# Patient Record
Sex: FEMALE | Race: WHITE
[De-identification: names, ages, dates, MRNs, and addresses within clinical notes are randomized per-mention and may not be internally consistent; named-entity substitution may affect disease eponyms.]

---

## 2017-12-01 ENCOUNTER — HOSPITAL ENCOUNTER (OUTPATIENT)
Dept: HOSPITAL 59 - HOP | Age: 67
Discharge: HOME | End: 2017-12-01
Attending: INTERNAL MEDICINE
Payer: MEDICARE

## 2017-12-01 DIAGNOSIS — Z79.890: ICD-10-CM

## 2017-12-01 DIAGNOSIS — K52.9: Primary | ICD-10-CM

## 2017-12-01 DIAGNOSIS — F32.9: ICD-10-CM

## 2017-12-01 DIAGNOSIS — E78.00: ICD-10-CM

## 2017-12-01 DIAGNOSIS — I48.91: ICD-10-CM

## 2017-12-01 DIAGNOSIS — K57.30: ICD-10-CM

## 2017-12-01 DIAGNOSIS — I10: ICD-10-CM

## 2017-12-01 PROCEDURE — 00810: CPT

## 2017-12-01 PROCEDURE — 88305 TISSUE EXAM BY PATHOLOGIST: CPT

## 2017-12-04 NOTE — OPERATIVE NOTE
DATE OF SURGERY:  12/01/2017



SURGEON: Carla Palmer MD  



OPERATION: COLONOSCOPY. 



INDICATIONS: This is a 67-year-old female with history of chronic diarrhea who presented for 
colonoscopy. 



POSTOPERATIVE DIAGNOSES:

1. Left-sided colonic diverticulosis. 

2. Otherwise normal colon and terminal ileal mucosa. 



ANESTHESIA: Sedation is per Anesthesia. Pulse oximetry was monitored throughout the procedure to 
maintain O2 saturation of 90% or greater. Supplemental oxygen was administered via nasal cannula. 
Cardiac and vital signs were monitored throughout the duration of the procedure, and they were 
stable. 



The procedure of colonoscopy and risks and alternatives of the procedure, including the risk of 
bleeding and perforation, among others, were explained to the patient who voiced understanding and 
agreed to have the procedure done. Physical examination was performed, and the patient was found 
stable for sedation. 



PROCEDURE: The patient was placed in the left lateral position. Sedation was initiated. A digital 
rectal exam was performed and showed some mild external hemorrhoids with no palpable rectal masses. 
An Olympus PCF-180AL colonoscope was then inserted into the rectum under direct visualization. It 
was advanced to the cecum without difficulty. The ileocecal valve and appendiceal orifice were 
identified and photographed. The colonic mucosa was carefully examined upon introduction of the 
colonoscope. There were scattered diverticula noted in the sigmoid and descending colon. There were 
no other lesions noted. The ileocecal valve was intubated and terminal ileal mucosa was inspected 
for about 10 cm and it appeared normal. The colonoscope was then withdrawn while carefully examining 
the colonic mucosal surfaces. No other lesions were noted. In the rectum, retroflexion was performed 
and grade 1 internal hemorrhoids were noted. Random colon biopsies were obtained to rule out 
microscopic colitis. 



RECOMMENDATIONS: 

1. The patient should be on a high-fiber diet. 

2. The patient is to have a repeat colonoscopy for screening in about 10 years.



Thank you for allowing me to participate in the care of your patient. CC: Dr. Veronica CADE

## 2018-08-17 ENCOUNTER — HOSPITAL ENCOUNTER (EMERGENCY)
Dept: HOSPITAL 59 - ER | Age: 68
Discharge: ANOTHER HOSPITAL | End: 2018-08-17
Payer: MEDICARE

## 2018-08-17 DIAGNOSIS — I10: ICD-10-CM

## 2018-08-17 DIAGNOSIS — R51: ICD-10-CM

## 2018-08-17 DIAGNOSIS — I63.9: Primary | ICD-10-CM

## 2018-08-17 DIAGNOSIS — H53.9: ICD-10-CM

## 2018-08-17 DIAGNOSIS — R42: ICD-10-CM

## 2018-08-17 DIAGNOSIS — Z79.01: ICD-10-CM

## 2018-08-17 DIAGNOSIS — I48.2: ICD-10-CM

## 2018-08-17 DIAGNOSIS — Z95.810: ICD-10-CM

## 2018-08-17 LAB
ANION GAP SERPL CALC-SCNC: 11 MMOL/L (ref 7–16)
APTT BLD: 26.5 SECONDS (ref 24.5–39.1)
BASOPHILS NFR BLD: 0.2 % (ref 0–6)
BUN SERPL-MCNC: 14 MG/DL (ref 8–23)
CO2 SERPL-SCNC: 29 MMOL/L (ref 22–29)
CREAT SERPL-MCNC: 0.7 MG/DL (ref 0.5–0.9)
EOSINOPHIL NFR BLD: 4.8 % (ref 0–6)
ERYTHROCYTE [DISTWIDTH] IN BLOOD BY AUTOMATED COUNT: 13.4 % (ref 11.5–14.5)
EST GLOMERULAR FILTRATION RATE: > 60 ML/MIN
GLUCOSE SERPL-MCNC: 123 MG/DL (ref 74–109)
GRANULOCYTES NFR BLD: 64.5 % (ref 47–80)
HCT VFR BLD CALC: 40.2 % (ref 35–47)
HGB BLD-MCNC: 12.4 GM/DL (ref 11.6–16)
INR PPP: 1
LYMPHOCYTES NFR BLD AUTO: 22.4 % (ref 16–45)
MCH RBC QN AUTO: 29.4 PG (ref 27–33)
MCHC RBC AUTO-ENTMCNC: 30.8 G/DL (ref 32–36)
MCV RBC AUTO: 95.3 FL (ref 81–97)
MONOCYTES NFR BLD: 8.1 % (ref 0–9)
PLATELET # BLD: 233 K/UL (ref 130–400)
PMV BLD AUTO: 9.7 FL (ref 7.4–10.4)
PROTHROMBIN TIME: 10.4 SECONDS (ref 9.5–12.1)
RBC # BLD AUTO: 4.22 M/UL (ref 3.8–5.4)
WBC # BLD AUTO: 4.6 K/UL (ref 4.2–12.2)

## 2018-08-17 PROCEDURE — 99285 EMERGENCY DEPT VISIT HI MDM: CPT

## 2018-08-17 PROCEDURE — 93010 ELECTROCARDIOGRAM REPORT: CPT

## 2018-08-17 PROCEDURE — 70450 CT HEAD/BRAIN W/O DYE: CPT

## 2018-08-17 PROCEDURE — 85025 COMPLETE CBC W/AUTO DIFF WBC: CPT

## 2018-08-17 PROCEDURE — 93005 ELECTROCARDIOGRAM TRACING: CPT

## 2018-08-17 PROCEDURE — 85730 THROMBOPLASTIN TIME PARTIAL: CPT

## 2018-08-17 PROCEDURE — 80048 BASIC METABOLIC PNL TOTAL CA: CPT

## 2018-08-17 PROCEDURE — 85610 PROTHROMBIN TIME: CPT

## 2018-08-17 NOTE — EMERGENCY DEPARTMENT RECORD
History of Present Illness





- General


Chief complaint: Eye Problem


Stated complaint: VISION ISSUE/HEADACHE/DIZZY


Time Seen by Provider: 08/17/18 09:43





- Related Data


 Home Medications











 Medication  Instructions  Recorded  Confirmed  Last Taken


 


Apixaban [Eliquis] 5 mg PO BID 08/17/18 08/17/18 Unknown


 


Atorvastatin Calcium [Lipitor] 10 mg PO QHS 08/17/18 08/17/18 Unknown


 


Calcium Carbonate [Calcium] 500 mg PO BID 08/17/18 08/17/18 Unknown


 


Diltiazem HCl [Diltiazem 24Hr ER] 100 mg PO BID 08/17/18 08/17/18 Unknown


 


Flecainide Acetate 50 mg PO BID 08/17/18 08/17/18 Unknown


 


Tofacitinib Citrate [Xeljanz] 5 mg PO DAILY 08/17/18 08/17/18 Unknown











 Allergies











Allergy/AdvReac Type Severity Reaction Status Date / Time


 


Carbapenems Allergy Unknown RASH Verified 08/17/18 10:02


 


Cephalosporins Allergy Unknown RASH Verified 08/17/18 10:02


 


erythromycin base Allergy Unknown RASH Verified 08/17/18 10:02


 


levofloxacin Allergy Unknown RASH Verified 08/17/18 10:02


 


Macrolide Antibiotics Allergy Unknown RASH Verified 08/17/18 10:02


 


Penicillins Allergy Unknown RASH Verified 08/17/18 10:02


 


pseudoephedrine Allergy Unknown PT UNSURE Verified 08/17/18 10:02





   OF REACTION  


 


Quinolones Allergy Unknown HYPERSENSIT Verified 08/17/18 10:02





   IVITY  


 


Sulfa (Sulfonamide Allergy Unknown HYPERSENSIT Verified 08/17/18 10:02





Antibiotics)   IVITY  


 


sulfamethoxazole Allergy Unknown HYPERSENSIT Verified 08/17/18 10:02





   IVITY  


 


tramadol Allergy Unknown HYPERSENSIT Verified 08/17/18 10:02





   IVITY  


 


trimethoprim Allergy Unknown HYPERSENSIT Verified 08/17/18 10:02





   IVITY  


 


acetaminophen [From Vicodin] Allergy  itching Verified 08/17/18 10:02


 


cefaclor Allergy  RASH Verified 08/17/18 10:23


 


ciprofloxacin Allergy  DIARRHEA Verified 08/17/18 10:23


 


hydrocodone bitartrate Allergy  itching Verified 08/17/18 10:02





[From Vicodin]     


 


Allergies: Allergy Unknown HYPERSENSIT Uncoded 08/17/18 10:02





   IVITY  














Past Medical History





- SOCIAL HISTORY


Smoking Status: Never smoker





- RESPIRATORY


Hx Respiratory Disorders: Yes


Hx Asthma: Yes


Hx Pneumonia: Yes


Hx Sleep Apnea: Yes


Hx of CPAP: No





- CARDIOVASCULAR


Hx Cardio Disorders: Yes


Hx Abnormal EKG: Yes


Hx Deep Vein Thrombosis: Yes


Hx Hypertension: Yes


Hx Irregular Heartbeat: Yes (AFib)


Hx Pacemaker/Defib: Yes





- NEURO


Hx Neuro Disorders: No





- GI


Hx GI Disorders: No





- 


Hx Genitourinary Disorders: No





- ENDOCRINE


Hx Endocrine Disorders: No





- MUSCULOSKELETAL


Hx Musculoskeletal Disorders: Yes


Hx Arthritis: Yes





- PSYCH


Hx Psych Problems: Yes


Hx Depression: Yes





- HEMATOLOGY/ONCOLOGY


Hx Hematology/Oncology Disorders: No





Family Medical History


Hx Cancer: Mother


Hx Diabetes: Father, Mother


Hx Heart Disease: Mother





Medical Decision Making





- Lab Data


Result diagrams: 


 08/17/18 10:00





 08/17/18 10:00





Disposition


Clinical Impression: 


 Visual changes, Headache, Atrial fibrillation, CVA (cerebral vascular accident)





Disposition: Acute Care Hospital Transfer


Condition: (2) Stable


Forms:  Patient Portal Access





Quality





- Quality Measures


Quality Measures: N/A





- Blood Pressure Screening


Does Patient Have Any of the Following: No


Blood Pressure Classification: Pre-Hypertensive BP Reading


Systolic Measurement: 150


Diastolic Measurement: 83


Screening for High Blood Pressure: < Pre-Hypertensive BP, F/U Documented > [

]


Pre-Hypertensive Follow-up Interventions: Referral to alternative/primary care 

provider.

## 2018-08-17 NOTE — EMERGENCY DEPARTMENT RECORD
History of Present Illness





- General


Chief complaint: Eye Problem


Stated complaint: VISION ISSUE/HEADACHE/DIZZY


Time Seen by Provider: 18 09:43


Source: Patient, RN notes reviewed


Mode of Arrival: Ambulatory





- History of Present Illness


Initial comments: 


patient had loss of vision looking to the right which started when she wake up 

and she wake this am with a headache and she had a cardioversion yesterday at 

[facility] with Dr. Farooq for atrial fib and she is on elliquis. Patient has 

been in atrial fib for three years and this was her third cardioversion 

yesterday. No other neuro findings.   





MD chief complaint: Vision change


Onset/Timin


-: Hour(s)


Onset Description: Sudden


Location: Right eye


Place: Other


Eye Symptoms: Decreased vision, Other


Severity: Moderate


Consistency: Constant





- Related Data


 Home Medications











 Medication  Instructions  Recorded  Confirmed  Last Taken


 


Apixaban [Eliquis] 5 mg PO BID 18 Unknown


 


Atorvastatin Calcium [Lipitor] 10 mg PO QHS 18 Unknown


 


Calcium Carbonate [Calcium] 500 mg PO BID 18 Unknown


 


Diltiazem HCl [Diltiazem 24Hr ER] 100 mg PO BID 18 Unknown


 


Flecainide Acetate 50 mg PO BID 18 Unknown


 


Tofacitinib Citrate [Xeljanz] 5 mg PO DAILY 18 Unknown











 Allergies











Allergy/AdvReac Type Severity Reaction Status Date / Time


 


Carbapenems Allergy Unknown RASH Verified 18 10:02


 


Cephalosporins Allergy Unknown RASH Verified 18 10:02


 


erythromycin base Allergy Unknown RASH Verified 18 10:02


 


levofloxacin Allergy Unknown RASH Verified 18 10:02


 


Macrolide Antibiotics Allergy Unknown RASH Verified 18 10:02


 


Penicillins Allergy Unknown RASH Verified 18 10:02


 


pseudoephedrine Allergy Unknown PT UNSURE Verified 18 10:02





   OF REACTION  


 


Quinolones Allergy Unknown HYPERSENSIT Verified 18 10:02





   IVITY  


 


Sulfa (Sulfonamide Allergy Unknown HYPERSENSIT Verified 18 10:02





Antibiotics)   IVITY  


 


sulfamethoxazole Allergy Unknown HYPERSENSIT Verified 18 10:02





   IVITY  


 


tramadol Allergy Unknown HYPERSENSIT Verified 18 10:02





   IVITY  


 


trimethoprim Allergy Unknown HYPERSENSIT Verified 18 10:02





   IVITY  


 


acetaminophen [From Vicodin] Allergy  itching Verified 18 10:02


 


cefaclor Allergy  RASH Verified 18 10:23


 


ciprofloxacin Allergy  DIARRHEA Verified 18 10:23


 


hydrocodone bitartrate Allergy  itching Verified 18 10:02





[From Vicodin]     


 


Allergies: Allergy Unknown HYPERSENSIT Uncoded 18 10:02





   IVITY  














Travel Screening





- Travel/Exposure Within Last 30 Days


Have you traveled within the last 30 days?: No





- Travel/Exposure Within Last Year


Have you traveled outside the [place] in the last year?: No





- Additonal Travel Details


Have you been exposed to anyone with a communicable illness?: No





- Travel Symptoms


Symptom Screening: None





Review of Systems


Reviewed: No additional complaints except as noted below


Constitutional: Reports: As per HPI.  Denies: Chills, Fever, Malaise, Night 

sweats, Weakness, Weight change


Eyes: Reports: As per HPI, Vision change (loss of vision looking right).  Denies

: Eye discharge, Eye pain, Photophobia


ENT: Reports: As per HPI.  Denies: Congestion, Dental pain, Ear pain, Epistaxis

, Hearing loss, Throat pain


Respiratory: Reports: As per HPI.  Denies: Cough, Dyspnea, Hemoptysis, Stridor, 

Wheezes


Cardiovascular: Reports: As per HPI.  Denies: Arrhythmia, Chest pain, Dyspnea 

on exertion, Edema, Murmurs, Orthopnea, Palpitations, Paroxysmal nocturnal 

dyspnea, Rheumatic Fever, Syncope


Endocrine: Reports: As per HPI.  Denies: Fatigue, Heat or cold intolerance, 

Polydipsia, Polyuria


Gastrointestinal: Reports: As per HPI.  Denies: Abdominal pain, Constipation, 

Diarrhea, Hematemesis, Hematochezia, Melena, Nausea, Vomiting


Genitourinary: Reports: As per HPI.  Denies: Abnormal menses, Discharge, 

Dyspareunia, Dysuria, Frequency, Hematuria, Incontinence, Retention, Urgency


Musculoskeletal: Reports: As per HPI.  Denies: Arthralgia, Back pain, Gout, 

Joint swelling, Myalgia, Neck pain


Skin: Reports: As per HPI.  Denies: Bruising, Change in color, Change in hair/

nails, Lesions, Pruritus, Rash


Neurological: Reports: As per HPI, Headache.  Denies: Abnormal gait, Confusion, 

Numbness, Paresthesias, Seizure, Tingling, Tremors, Vertigo, Weakness


Psychiatric: Reports: As per HPI.  Denies: Anxiety, Auditory hallucinations, 

Depression, Homicidal thoughts, Suicidal thoughts, Visual hallucinations


Hematological/Lymphatic: Reports: As per HPI.  Denies: Anemia, Blood Clots, 

Easy bleeding, Easy bruising, Swollen glands





Past Medical History





- SOCIAL HISTORY


Smoking Status: Never smoker





- RESPIRATORY


Hx Respiratory Disorders: Yes


Hx Asthma: Yes


Hx Pneumonia: Yes


Hx Sleep Apnea: Yes


Hx of CPAP: No





- CARDIOVASCULAR


Hx Cardio Disorders: Yes


Hx Abnormal EKG: Yes


Hx Deep Vein Thrombosis: Yes


Hx Hypertension: Yes


Hx Irregular Heartbeat: Yes (AFib)


Hx Pacemaker/Defib: Yes





- NEURO


Hx Neuro Disorders: No





- GI


Hx GI Disorders: No





- 


Hx Genitourinary Disorders: No





- ENDOCRINE


Hx Endocrine Disorders: No





- MUSCULOSKELETAL


Hx Musculoskeletal Disorders: Yes


Hx Arthritis: Yes





- PSYCH


Hx Psych Problems: Yes


Hx Depression: Yes





- HEMATOLOGY/ONCOLOGY


Hx Hematology/Oncology Disorders: No





Family Medical History


Hx Cancer: Mother


Hx Diabetes: Father, Mother


Hx Heart Disease: Mother





Physical Exam





- General


General Appearance: Alert, Oriented x3, Cooperative, Mild distress





- Head


Head exam: Normal inspection





- Eye


Eye exam: Normal appearance, PERRL, Other (bilateral hemianopsia on the right 

side)


Pupils: Normal accommodation





- ENT


ENT exam: Normal exam, Mucous membranes moist, Normal external ear exam, Normal 

orophraynx, TM's normal bilaterally


Ear exam: Normal external inspection.  negative: External canal tenderness


Nasal Exam: Normal inspection.  negative: Discharge, Sinus tenderness


Mouth exam: Normal external inspection, Tongue normal


Teeth exam: Normal inspection.  negative: Dental caries


Throat exam: Normal inspection.  negative: Tonsillar erythema, Tonsillar exudate





- Neck


Neck exam: Normal inspection, Full ROM.  negative: Tenderness





- Respiratory


Respiratory exam: Normal lung sounds bilaterally.  negative: Respiratory 

distress





- Cardiovascular


Cardiovascular Exam: Regular rate, Normal rhythm, Normal heart sounds





- GI/Abdominal


GI/Abdominal exam: Soft, Normal bowel sounds.  negative: Tenderness





- Rectal


Rectal exam: Deferred





- 


 exam: Deferred





- Extremities


Extremities exam: Normal inspection, Full ROM, Normal capillary refill.  

negative: Tenderness





- Back


Back exam: Reports: Normal inspection, Full ROM.  Denies: Muscle spasm, Rash 

noted, Tenderness





- Neurological


Neurological exam: Alert, Normal gait, Oriented X3, Reflexes normal





- Psychiatric


Psychiatric exam: Normal affect, Normal mood





- Skin


Skin exam: Dry, Intact, Normal color, Warm





Course





 Vital Signs











  18





  09:43


 


Temperature 98.7 F


 


Pulse Rate 72


 


Respiratory 16





Rate 


 


Blood Pressure 150/83


 


Pulse Ox 97














- Reevaluation(s)


Reevaluation #1: 


patient was without symptoms last night at 9 pm and discussed case [facility] 

stroke team Dr. Castellano and the ED physician Dr. Denney and will transfer to 

[facility] for possible intervention.


18 10:54








Medical Decision Making





- Data Complexity


MDM Data: Labs Ordered and/or Reviewed, X-Ray Ordered and/or Reviewed (CT head 

negative), EKG Ordered and/or Reviewed (NSR no acute changes)





- Lab Data


Result diagrams: 


 18 10:00





 18 10:00





Disposition


Clinical Impression: 


 Visual changes





Headache


Qualifiers:


 Headache type: unspecified Headache chronicity pattern: acute headache 

Intractability: not intractable Qualified Code(s): R51 - Headache





Atrial fibrillation


Qualifiers:


 Atrial fibrillation type: chronic Qualified Code(s): I48.2 - Chronic atrial 

fibrillation





CVA (cerebral vascular accident)


Qualifiers:


 CVA mechanism: unspecified Qualified Code(s): I63.9 - Cerebral infarction, 

unspecified





Disposition: Acute Care Hospital Transfer


Condition: (2) Stable


Forms:  Patient Portal Access


Time of Disposition: 10:56





Quality





- Quality Measures


Quality Measures: N/A





- Blood Pressure Screening


Does Patient Have Any of the Following: No


Blood Pressure Classification: Pre-Hypertensive BP Reading


Systolic Measurement: 150


Diastolic Measurement: 83


Screening for High Blood Pressure: < Pre-Hypertensive BP, F/U Documented > [

]


Pre-Hypertensive Follow-up Interventions: Referral to alternative/primary care 

provider.

## 2018-08-19 NOTE — CT SCAN REPORT
EXAM:  CT SCAN HEAD WO CONTRAST 



HISTORY:  HEADACHE.



TECHNIQUE:  Axial CT scan of the head performed without IV contrast. 



COMPARISON:  None.



FINDINGS:  No definite acute intracranial hemorrhage identified. No focal mass 
effect or midline shift apparent. Mild generalized atrophy and some mild chronic
-appearing deep white matter changes are seen, nonspecific but likely 
representing some chronic small vessel deep white matter ischemic disease. No 
definite acute infarct or intracranial mass lesion is seen. No depressed 
calvarial fracture evident. Paranasal sinuses all appear essentially clear. 



IMPRESSION:  



1.  MILD GENERALIZED ATROPHY WITH SOME CHRONIC-APPEARING DEEP WHITE MATTER 
CHANGES. 

2.  NO DEFINITE ACUTE INTRACRANIAL HEMORRHAGE OR FOCAL MASS EFFECT IDENTIFIED.



JOB NUMBER:  [id number]
[facility]